# Patient Record
Sex: FEMALE | Race: WHITE | Employment: FULL TIME | URBAN - METROPOLITAN AREA
[De-identification: names, ages, dates, MRNs, and addresses within clinical notes are randomized per-mention and may not be internally consistent; named-entity substitution may affect disease eponyms.]

---

## 2017-01-07 ENCOUNTER — TRANSFERRED RECORDS (OUTPATIENT)
Dept: MULTI SPECIALTY CLINIC | Facility: CLINIC | Age: 22
End: 2017-01-07

## 2017-01-07 LAB — PAP-ABSTRACT: NORMAL

## 2019-09-27 ENCOUNTER — RECORDS - HEALTHEAST (OUTPATIENT)
Dept: LAB | Facility: HOSPITAL | Age: 24
End: 2019-09-27

## 2019-09-30 LAB
GAMMA INTERFERON BACKGROUND BLD IA-ACNC: 0.03 IU/ML
M TB IFN-G BLD-IMP: NEGATIVE
MITOGEN IGNF BCKGRD COR BLD-ACNC: 0 IU/ML
MITOGEN IGNF BCKGRD COR BLD-ACNC: 0 IU/ML
QTF INTERPRETATION: NORMAL
QTF MITOGEN - NIL: >10 IU/ML

## 2019-11-07 ENCOUNTER — OFFICE VISIT (OUTPATIENT)
Dept: FAMILY MEDICINE | Facility: CLINIC | Age: 24
End: 2019-11-07
Payer: COMMERCIAL

## 2019-11-07 VITALS
TEMPERATURE: 99.4 F | OXYGEN SATURATION: 100 % | BODY MASS INDEX: 22.45 KG/M2 | SYSTOLIC BLOOD PRESSURE: 137 MMHG | HEART RATE: 102 BPM | DIASTOLIC BLOOD PRESSURE: 90 MMHG | WEIGHT: 148.1 LBS | HEIGHT: 68 IN

## 2019-11-07 DIAGNOSIS — Z13.220 LIPID SCREENING: ICD-10-CM

## 2019-11-07 DIAGNOSIS — F41.9 ANXIETY: ICD-10-CM

## 2019-11-07 DIAGNOSIS — F98.8 ATTENTION DEFICIT DISORDER, UNSPECIFIED HYPERACTIVITY PRESENCE: ICD-10-CM

## 2019-11-07 DIAGNOSIS — R03.0 ELEVATED BLOOD-PRESSURE READING WITHOUT DIAGNOSIS OF HYPERTENSION: ICD-10-CM

## 2019-11-07 DIAGNOSIS — Z13.1 SCREENING FOR DIABETES MELLITUS: ICD-10-CM

## 2019-11-07 DIAGNOSIS — Z76.89 ENCOUNTER TO ESTABLISH CARE: Primary | ICD-10-CM

## 2019-11-07 PROCEDURE — 99204 OFFICE O/P NEW MOD 45 MIN: CPT | Performed by: INTERNAL MEDICINE

## 2019-11-07 RX ORDER — DEXTROAMPHETAMINE SACCHARATE, AMPHETAMINE ASPARTATE, DEXTROAMPHETAMINE SULFATE AND AMPHETAMINE SULFATE 2.5; 2.5; 2.5; 2.5 MG/1; MG/1; MG/1; MG/1
10 TABLET ORAL
COMMUNITY
Start: 2018-05-21 | End: 2019-12-09

## 2019-11-07 RX ORDER — LEVONORGESTREL/ETHIN.ESTRADIOL 0.1-0.02MG
1 TABLET ORAL
COMMUNITY
Start: 2019-10-29 | End: 2020-02-13

## 2019-11-07 SDOH — HEALTH STABILITY: MENTAL HEALTH: HOW OFTEN DO YOU HAVE 6 OR MORE DRINKS ON ONE OCCASION?: NEVER

## 2019-11-07 SDOH — HEALTH STABILITY: MENTAL HEALTH: HOW MANY STANDARD DRINKS CONTAINING ALCOHOL DO YOU HAVE ON A TYPICAL DAY?: 3 OR 4

## 2019-11-07 SDOH — HEALTH STABILITY: MENTAL HEALTH: HOW OFTEN DO YOU HAVE A DRINK CONTAINING ALCOHOL?: 2-3 TIMES A WEEK

## 2019-11-07 ASSESSMENT — MIFFLIN-ST. JEOR: SCORE: 1470.28

## 2019-11-07 NOTE — Clinical Note
Please abstract the following data from this visit with this patient into the appropriate field in Epic:Tests that can be patient reported without a hard copy:Pap smear done on this date: 01/07/2019 (approximately), by this group: [facility] Everywhere, results were Normal. Other Tests found in the patient's chart through Chart Review/Care Everywhere:{Abstract Quality List (Optional):043491}Note to Abstraction: If this section is blank, no results were found via Chart Review/Care Everywhere.

## 2019-11-07 NOTE — PROGRESS NOTES
Subjective     Martha Lagunas is a 24 year old female who presents to clinic today for the following health issues:    HPI   New Patient/Transfer of Care  Chief Complaint   Patient presents with     New Patient     Establish Care     Medication Follow-up     Adderall and Lynn    Patient presents for establishing care and discuss chronic medical problems; she is maintained on Adderall 10 mg 3 times a day for history of ADHD, she has been diagnosed 3 years now, has been on the same treatment for 3 years, medication helps with her work and focusing she is a nurse on oncology floor, she relocated from [place] she works at [employer].  She denies any palpitations or any side effects from Adderall, denies any chest pain insomnia or weight loss, she does have history of migraine headaches that has been quiescent. she does have history of anxiety and depression; in the past she was on SSRI treatment.  She feels she has done better without treatment now, she has relocated to the area with her boyfriend and her family is in the area.  Happy with the move.  No unusual stressors.  She gets elevated blood pressure when she comes to the clinic as she describes.  Her Adderall was prescribed by a behavioral specialist in the past , she is up-to-date on her Pap smear through GYN.  No history of abnormal Pap smears.  She is on oral contraceptives she has adequate supply for both.  Medication:  Current Outpatient Medications   Medication Sig Dispense Refill     amphetamine-dextroamphetamine (ADDERALL) 10 MG tablet Take 1 Tablet by mouth three times a day. Appt due August 2019 90 Tablet 0     Levonorgestrel-Ethinyl Estrad (VIENVA) 0.1-20 MG-MCG tablet Take 1 Tablet by mouth daily. 84 Tablet 3     No current facility-administered medications for this visit.     No Known Allergies    Immunization History   Administered Date(s) Administered     4vHPV (Gardasil) 07/12/2007, 09/12/2007, 03/14/2011     DTP-Hib (Tetramune)  1995, 1995, 1995, 09/16/1996     DTaP 06/22/2000     Flu Vac Preserv Free (3+yrs) 01/22/2004     HepA Ped/Adol (1-18 yrs) 09/12/2007, 03/14/2011     HepB Ped/Adol (0-19 yrs) 1995, 1995, 05/02/1996     Influenza (Flucelvax), Preserv Free QIV 10/11/2017     Influenza (Fluzone 0.25, 6-35 mos) 10/17/2013, 08/15/2017     Influenza IIV4 (Quadrivalent) 0.5mL (48428) 10/17/2013, 11/14/2018     Influenza, Unspecified Formulation 12/01/2003, 01/22/2004, 09/15/2014     MCV4 (Menactra) 07/12/2007     MMR 09/16/1996, 06/22/2000     MPSV4 (Menomune) 07/12/2007     OPV, Trivalent (Orimune or tOPV) 1995, 1995, 1995, 06/22/2000     TDAP (ADACEL) 07/12/2007     Td (7+ yrs) 08/15/2017     Td, Preservative Free 08/15/2017     Typhoid (Vivotif, Oral) 02/19/2018     Past Medical History:   Diagnosis Date     Anxiety (HRC)     Depression (HRC)     Migraines     Past Surgical History:   Procedure Laterality Date     WISDOM TEETH EXTRACTION     Menstrual History: No significant menorrhagia or dysmenorrhea. Cycles are regular. No unscheduled bleeding.     Contraception: OCPs     Past GYN History:   History of abnormal pap smears: none  Last pap smear: 2017  High risk HPV testing: n/a  Sexually active: yes  History of sexually transmitted infections: none  Sexually transmitted infection screening at this visit: declines    Health Maintenance:   Immunization History   Administered Date(s) Administered     4vHPV (Gardasil) 07/12/2007, 09/12/2007, 03/14/2011     DTP-Hib (Tetramune) 1995, 1995, 1995, 09/16/1996     DTaP 06/22/2000     Flu Vac Preserv Free (3+yrs) 01/22/2004     HepA Ped/Adol (1-18 yrs) 09/12/2007, 03/14/2011     HepB Ped/Adol (0-19 yrs) 1995, 1995, 05/02/1996     Influenza (Flucelvax), Preserv Free QIV 10/11/2017     Influenza (Fluzone 0.25, 6-35 mos) 10/17/2013, 08/15/2017     Influenza IIV4 (Quadrivalent) 0.5mL (80568) 10/17/2013, 11/14/2018      Influenza, Unspecified Formulation 12/01/2003, 01/22/2004, 09/15/2014     MCV4 (Menactra) 07/12/2007     MMR 09/16/1996, 06/22/2000     MPSV4 (Menomune) 07/12/2007     OPV, Trivalent (Orimune or tOPV) 1995, 1995, 1995, 06/22/2000     TDAP (ADACEL) 07/12/2007     Td (7+ yrs) 08/15/2017     Td, Preservative Free 08/15/2017     Typhoid (Vivotif, Oral) 02/19/2018     No results found for: CHOL, CHOL  No results found for: HDL  No results found for: LDL, LDLPOC  No results found for: TRI, TRIGPOC  No results found for: CHOL  No results found for: HGBA1C, HGBA1C, A1CDC, AA1C, A1CBA, A1CQ, A1CQL, ZWOB8AUDH  No results found for: GLUCOSE    Obstetrical History: No obstetric history on file.     Family History   Problem Relation Age of Onset     Migraines Birth Mother     Diabetes Paternal Uncle     Heart Attack Paternal Uncle     Diabetes Birth Father     Migraines Sister     ADHD Sister     Down Syndrome Paternal Aunt     Alzheimer's Paternal Grandmother     Social History     Socioeconomic History     Marital status: Single   Spouse name: Not on file     Number of children: 0     Years of education: Not on file     Highest education level: Not on file   Occupational History     Occupation: student   Social Needs     Financial resource strain: Not on file     Food insecurity:   Worry: Not on file   Inability: Not on file     Transportation needs:   Medical: Not on file   Non-medical: Not on file   Tobacco Use     Smoking status: Never Smoker     Smokeless tobacco: Never Used   Substance and Sexual Activity     Alcohol use: Yes   Alcohol/week: 0.0 oz   Comment: couple drinks per week     Drug use: No     Sexual activity: Yes   Partners: Male   Birth control/protection: None   Lifestyle     Physical activity:   Days per week: Not on file   Minutes per session: Not on file     Stress: Not on file   Relationships     Social connections:   Talks on phone: Not on file   Gets together: Not on file   Attends  Rastafari service: Not on file   Active member of club or organization: Not on file   Attends meetings of clubs or organizations: Not on file   Relationship status: Not on file     Intimate partner violence:   Fear of current or ex partner: Not on file   Emotionally abused: Not on file   Physically abused: Not on file   Forced sexual activity: Not on file   Other Topics Concern     Bike Helmet Yes     [place] Yes     Exercise Yes   Comment: exercises 4 times per week     Guns in home No     Seat Belt Yes     Special Diet No     Weight Concern No      Service Not Asked     Blood Transfusions Not Asked     Caffeine Concern Not Asked     Occupational Exposure Not Asked     Hobby Hazards Not Asked     Sleep Concern Not Asked     Stress Concern Not Asked     Back Care Not Asked     Self-Exams Not Asked   Social History Narrative   Lives at home with mom, dad, sister. Student. In a relationship.          There is no problem list on file for this patient.    Past Surgical History:   Procedure Laterality Date     HC TOOTH EXTRACTION W/FORCEP         Social History     Tobacco Use     Smoking status: Never Smoker     Smokeless tobacco: Never Used   Substance Use Topics     Alcohol use: Yes     Alcohol/week: 2.0 standard drinks     Types: 2 Glasses of wine per week     Frequency: 2-3 times a week     Drinks per session: 3 or 4     Binge frequency: Never     Family History   Problem Relation Age of Onset     Myocardial Infarction Other      Down Syndrome Other      Migraines Mother      Diabetes Father      Migraines Sister      Attention Deficit Disorder Sister      Alzheimer Disease Paternal Grandmother          Current Outpatient Medications   Medication Sig Dispense Refill     amphetamine-dextroamphetamine (ADDERALL) 10 MG tablet Take 10 mg by mouth       levonorgestrel-ethinyl estradiol (AVIANE/ALESSE/LESSINA) 0.1-20 MG-MCG tablet Take 1 tablet by mouth       No Known Allergies  No lab results found.   BP  "Readings from Last 3 Encounters:   11/07/19 (!) 137/90    Wt Readings from Last 3 Encounters:   11/07/19 67.2 kg (148 lb 1.6 oz)                    Reviewed and updated as needed this visit by Provider  Tobacco  Allergies  Meds  Med Hx  Surg Hx  Fam Hx  Soc Hx        Review of Systems   ROS COMP: Constitutional, HEENT, cardiovascular, pulmonary, GI, , musculoskeletal, neuro, skin, endocrine and psych systems are negative, except as otherwise noted.      Objective    BP (!) 137/90 (BP Location: Right arm, Patient Position: Sitting, Cuff Size: Adult Regular)   Pulse 102   Temp 99.4  F (37.4  C) (Tympanic)   Ht 1.727 m (5' 8\")   Wt 67.2 kg (148 lb 1.6 oz)   LMP 10/07/2019   SpO2 100%   Breastfeeding? No   BMI 22.52 kg/m    Body mass index is 22.52 kg/m .  Physical Exam   GENERAL: healthy, alert and no distress  EYES: Eyes grossly normal to inspection, PERRL and conjunctivae and sclerae normal  HENT: ear canals and TM's normal, nose and mouth without ulcers or lesions  NECK: no adenopathy, no asymmetry, masses, or scars and thyroid normal to palpation  RESP: lungs clear to auscultation - no rales, rhonchi or wheezes  CV: regular rate and rhythm, normal S1 S2, no S3 or S4, no murmur, click or rub, no peripheral edema and peripheral pulses strong  ABDOMEN: soft, nontender, no hepatosplenomegaly, no masses and bowel sounds normal  MS: no gross musculoskeletal defects noted, no edema  SKIN: no suspicious lesions or rashes  NEURO: Normal strength and tone, mentation intact and speech normal  PSYCH: mentation appears normal, affect normal/bright    Diagnostic Test Results:  Labs reviewed in Epic        Assessment & Plan   Problem List Items Addressed This Visit     None      Visit Diagnoses     Encounter to establish care    -  Primary    Elevated blood-pressure reading without diagnosis of hypertension        Relevant Orders    Comprehensive metabolic panel (BMP + Alb, Alk Phos, ALT, AST, Total. Bili, TP)    " Attention deficit disorder, unspecified hyperactivity presence        Relevant Medications    amphetamine-dextroamphetamine (ADDERALL) 10 MG tablet    Other Relevant Orders    MENTAL HEALTH REFERRAL  - Adult; Psychiatry and Medication Management; Psychiatry; Other: Not Listed - Enter Referral Details in Scheduling Comments Below; Patient call to schedule    Anxiety        Relevant Orders    MENTAL HEALTH REFERRAL  - Adult; Psychiatry and Medication Management; Psychiatry; Other: Not Listed - Enter Referral Details in Scheduling Comments Below; Patient call to schedule    TSH with free T4 reflex    Screening for diabetes mellitus        Relevant Orders    Hemoglobin A1c    Lipid screening        Relevant Orders    Lipid panel reflex to direct LDL Fasting         Patient would like to defer blood testing, she comes back for follow-up when she sees her behavioral specialist, she was in agreement to see psychiatry for management of ADD as well as her history of anxiety.  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  Continue with lifestyle changes.  Advised that her blood pressure was elevated in the clinic, Adderall also given can cause slight elevation of blood pressure but probably is more of whitecoat syndrome and anxiety related.  No other side effects from Adderall, no insomnia , weight loss, chest pain, stomachache or other systemic complaints.  Her migraines seems to be quiescent now.      See Patient Instructions  No follow-ups on file.    Gerardo Jay MD  [facility]

## 2019-11-27 ENCOUNTER — TELEPHONE (OUTPATIENT)
Dept: FAMILY MEDICINE | Facility: CLINIC | Age: 24
End: 2019-11-27

## 2019-11-27 NOTE — TELEPHONE ENCOUNTER
Reason for Call:  Other     Detailed comments: Pt would like a phone number for a referral for mental health.  There is a referral in her chart but now phone number listed.  She has not been called to schedule     Phone Number Patient can be reached at: Home number on file [phone] (home)    Best Time: any    Can we leave a detailed message on this number? YES    Call taken on 11/27/2019 at 11:17 AM by Matilda Barakat

## 2019-11-27 NOTE — TELEPHONE ENCOUNTER
Called patient - left a detailed message with phone numbers listed in psychiatry referrals in [facility] ([facility]: [phone]; [facility] [phone])    Lizabeth SANTOS RN

## 2019-12-02 RX ORDER — DEXTROAMPHETAMINE SACCHARATE, AMPHETAMINE ASPARTATE, DEXTROAMPHETAMINE SULFATE AND AMPHETAMINE SULFATE 2.5; 2.5; 2.5; 2.5 MG/1; MG/1; MG/1; MG/1
10 TABLET ORAL DAILY
Qty: 30 TABLET | Refills: 0 | Status: CANCELLED | OUTPATIENT
Start: 2019-12-02

## 2019-12-02 NOTE — TELEPHONE ENCOUNTER
PCP,    Please see message below and advise. Pt states mental health provider is for counseling not prescribing adderall. She would like you to fill until she finds a doctor that will prescribe it.    Thank you,  Abilio BUITRAGO RN

## 2019-12-02 NOTE — TELEPHONE ENCOUNTER
Reason for Call:  Medication or medication refill:    Do you use a [facility]?  Name of the pharmacy and phone number for the current request:  [id number] - [place], [place] - [address] [place] [facility] [address]      Name of the medication requested: amphetamine-dextroamphetamine (ADDERALL) 10 MG   3 times a day    Other request: the referral that was placed was for counseling.  Pt needs a doctor who can prescribe her adderal.  At this time she is out of her medication and is hoping dr. Jay can prescribe this until she can find a doctor who will prescribe it.  She is out her her medication   Can we leave a detailed message on this number? YES    Phone number patient can be reached at: Home number on file [phone] (home)    Best Time: any    Call taken on 12/2/2019 at 1:23 PM by Matilda Barakat

## 2019-12-03 DIAGNOSIS — R03.0 ELEVATED BLOOD-PRESSURE READING WITHOUT DIAGNOSIS OF HYPERTENSION: ICD-10-CM

## 2019-12-03 DIAGNOSIS — Z13.1 SCREENING FOR DIABETES MELLITUS: ICD-10-CM

## 2019-12-03 DIAGNOSIS — F98.8 ATTENTION DEFICIT DISORDER, UNSPECIFIED HYPERACTIVITY PRESENCE: Primary | ICD-10-CM

## 2019-12-03 DIAGNOSIS — Z13.220 LIPID SCREENING: ICD-10-CM

## 2019-12-03 DIAGNOSIS — F41.9 ANXIETY: ICD-10-CM

## 2019-12-04 DIAGNOSIS — F41.9 ANXIETY: Primary | ICD-10-CM

## 2019-12-04 DIAGNOSIS — F98.8 ATTENTION DEFICIT DISORDER, UNSPECIFIED HYPERACTIVITY PRESENCE: ICD-10-CM

## 2019-12-04 DIAGNOSIS — R03.0 ELEVATED BLOOD-PRESSURE READING WITHOUT DIAGNOSIS OF HYPERTENSION: ICD-10-CM

## 2019-12-04 DIAGNOSIS — Z13.1 SCREENING FOR DIABETES MELLITUS: ICD-10-CM

## 2019-12-04 DIAGNOSIS — Z13.220 LIPID SCREENING: ICD-10-CM

## 2019-12-04 NOTE — PROGRESS NOTES
Talked to patient, advised her I would recommend she does UDS before we start prescribing psychostimulant,as I advised her to follow with psychiatry for long term refills of Adderall. patient in agreement with doing UDS, advised her we usually ask patient to sign psychostimulant contract to prescribe such medicine long term.[ I usually do not prescribe long term stimulants and I recommend patient follows with psychiatry]  We discussed her BP is slightly elevated and she attributes to being in clinic, she has a BP check as outpatient with diastolic of 89 mmHg as she reports, advised her to call us with 2 or 3 BP readings as outpatient,  Also suggesting taking Adderall to 10 mg twice a day , she was in agreement with such, also advised to take holiday breaks from psychostimulant medicine, during weekends, or holiday times, and she agreed,   patient is willing to do UDS once is resulted will send her a refill of adderall,to pharmacy on record, advised also patient to do labs requested last time , she stated she had a commitment and, she left clinic and could not do labs . Patient was appreciative of phone call follow up and she will come to do labs as requested soon. Advised patient such is my policy I follow for prescriptions of psychostimulants including asking for random UDS.

## 2019-12-09 ENCOUNTER — MYC MEDICAL ADVICE (OUTPATIENT)
Dept: FAMILY MEDICINE | Facility: CLINIC | Age: 24
End: 2019-12-09

## 2019-12-09 ENCOUNTER — TELEPHONE (OUTPATIENT)
Dept: FAMILY MEDICINE | Facility: CLINIC | Age: 24
End: 2019-12-09

## 2019-12-09 DIAGNOSIS — Z13.220 LIPID SCREENING: ICD-10-CM

## 2019-12-09 DIAGNOSIS — Z13.1 SCREENING FOR DIABETES MELLITUS: ICD-10-CM

## 2019-12-09 DIAGNOSIS — F98.8 ATTENTION DEFICIT DISORDER, UNSPECIFIED HYPERACTIVITY PRESENCE: ICD-10-CM

## 2019-12-09 DIAGNOSIS — R03.0 ELEVATED BLOOD-PRESSURE READING WITHOUT DIAGNOSIS OF HYPERTENSION: ICD-10-CM

## 2019-12-09 DIAGNOSIS — F98.8 ATTENTION DEFICIT DISORDER, UNSPECIFIED HYPERACTIVITY PRESENCE: Primary | ICD-10-CM

## 2019-12-09 DIAGNOSIS — F41.9 ANXIETY: ICD-10-CM

## 2019-12-09 LAB
AMPHETAMINES UR QL: NOT DETECTED NG/ML
BARBITURATES UR QL SCN: NOT DETECTED NG/ML
BENZODIAZ UR QL SCN: NOT DETECTED NG/ML
BUPRENORPHINE UR QL: NOT DETECTED NG/ML
CANNABINOIDS UR QL: NOT DETECTED NG/ML
COCAINE UR QL SCN: NOT DETECTED NG/ML
D-METHAMPHET UR QL: NOT DETECTED NG/ML
HBA1C MFR BLD: 5.1 % (ref 0–5.6)
METHADONE UR QL SCN: NOT DETECTED NG/ML
OPIATES UR QL SCN: NOT DETECTED NG/ML
OXYCODONE UR QL SCN: NOT DETECTED NG/ML
PCP UR QL SCN: NOT DETECTED NG/ML
PROPOXYPH UR QL: NOT DETECTED NG/ML
TRICYCLICS UR QL SCN: NOT DETECTED NG/ML

## 2019-12-09 PROCEDURE — 84443 ASSAY THYROID STIM HORMONE: CPT | Performed by: INTERNAL MEDICINE

## 2019-12-09 PROCEDURE — 36415 COLL VENOUS BLD VENIPUNCTURE: CPT | Performed by: INTERNAL MEDICINE

## 2019-12-09 PROCEDURE — 80306 DRUG TEST PRSMV INSTRMNT: CPT | Performed by: INTERNAL MEDICINE

## 2019-12-09 PROCEDURE — 80061 LIPID PANEL: CPT | Performed by: INTERNAL MEDICINE

## 2019-12-09 PROCEDURE — 83036 HEMOGLOBIN GLYCOSYLATED A1C: CPT | Performed by: INTERNAL MEDICINE

## 2019-12-09 PROCEDURE — 80053 COMPREHEN METABOLIC PANEL: CPT | Performed by: INTERNAL MEDICINE

## 2019-12-09 RX ORDER — DEXTROAMPHETAMINE SACCHARATE, AMPHETAMINE ASPARTATE, DEXTROAMPHETAMINE SULFATE AND AMPHETAMINE SULFATE 2.5; 2.5; 2.5; 2.5 MG/1; MG/1; MG/1; MG/1
10 TABLET ORAL 2 TIMES DAILY
Qty: 60 TABLET | Refills: 0 | Status: SHIPPED | OUTPATIENT
Start: 2019-12-09

## 2019-12-09 RX ORDER — DEXTROAMPHETAMINE SACCHARATE, AMPHETAMINE ASPARTATE, DEXTROAMPHETAMINE SULFATE AND AMPHETAMINE SULFATE 2.5; 2.5; 2.5; 2.5 MG/1; MG/1; MG/1; MG/1
10 TABLET ORAL 2 TIMES DAILY
Qty: 60 TABLET | Refills: 0 | Status: CANCELLED | OUTPATIENT
Start: 2019-12-09

## 2019-12-10 LAB
ALBUMIN SERPL-MCNC: 4 G/DL (ref 3.4–5)
ALP SERPL-CCNC: 47 U/L (ref 40–150)
ALT SERPL W P-5'-P-CCNC: 34 U/L (ref 0–50)
ANION GAP SERPL CALCULATED.3IONS-SCNC: 8 MMOL/L (ref 3–14)
AST SERPL W P-5'-P-CCNC: 25 U/L (ref 0–45)
BILIRUB SERPL-MCNC: 0.8 MG/DL (ref 0.2–1.3)
BUN SERPL-MCNC: 12 MG/DL (ref 7–30)
CALCIUM SERPL-MCNC: 9 MG/DL (ref 8.5–10.1)
CHLORIDE SERPL-SCNC: 103 MMOL/L (ref 94–109)
CHOLEST SERPL-MCNC: 221 MG/DL
CO2 SERPL-SCNC: 24 MMOL/L (ref 20–32)
CREAT SERPL-MCNC: 0.7 MG/DL (ref 0.52–1.04)
GFR SERPL CREATININE-BSD FRML MDRD: >90 ML/MIN/{1.73_M2}
GLUCOSE SERPL-MCNC: 88 MG/DL (ref 70–99)
HDLC SERPL-MCNC: 113 MG/DL
LDLC SERPL CALC-MCNC: 92 MG/DL
NONHDLC SERPL-MCNC: 108 MG/DL
POTASSIUM SERPL-SCNC: 3.9 MMOL/L (ref 3.4–5.3)
PROT SERPL-MCNC: 7.5 G/DL (ref 6.8–8.8)
SODIUM SERPL-SCNC: 135 MMOL/L (ref 133–144)
TRIGL SERPL-MCNC: 78 MG/DL
TSH SERPL DL<=0.005 MIU/L-ACNC: 0.95 MU/L (ref 0.4–4)

## 2019-12-10 NOTE — TELEPHONE ENCOUNTER
"PCP- please see pt's BP update on [employer] message below.    Per separate encounter:  \"Please notify patient adderrall refilled, faxed to pharmacy on records.  UDS negative  Dose decrease to BID, as discussed with patient and she was in agreement,   BP elevated, patient was advised to call us with some BP readings .  Please notify Random UDS may be asked, for her next refill, this is policy for refill of stimulants.\"    Informed pt via [employer]    Thank you,  Abilio BUITRAGO RN  "

## 2019-12-10 NOTE — PROGRESS NOTES
UDS is negative,   As discussed wit patient dose of adderrall decreased to 10 mg  BID as patient has elevated Blood pressure reading, patient was advised to call [facility] with BP readings.

## 2019-12-12 NOTE — TELEPHONE ENCOUNTER
Please advise BP readings look better, need couple more readings in next 1 month [while on BID dose of adderall]

## 2020-02-13 ENCOUNTER — TELEPHONE (OUTPATIENT)
Dept: FAMILY MEDICINE | Facility: CLINIC | Age: 25
End: 2020-02-13

## 2020-02-13 NOTE — TELEPHONE ENCOUNTER
Refill given for OCP  Need 2 more BP readings.   Please schedule a follow up  Has she seen/established with Mental health for ADHD/ adderral management an refill?

## 2020-02-13 NOTE — TELEPHONE ENCOUNTER
Spoke with patient, she state that she already scheduled an appointment with Mental Health. She did had her BP check.      BP: 107/74    She is going to call back with the second BP reading.    Petra Nolasco CMA

## 2020-02-24 ENCOUNTER — HEALTH MAINTENANCE LETTER (OUTPATIENT)
Age: 25
End: 2020-02-24

## 2020-05-06 ENCOUNTER — E-VISIT (OUTPATIENT)
Dept: FAMILY MEDICINE | Facility: CLINIC | Age: 25
End: 2020-05-06

## 2020-05-06 DIAGNOSIS — R22.0 FACIAL SWELLING: Primary | ICD-10-CM

## 2020-05-06 PROCEDURE — 99207 ZZC NO BILLABLE SERVICE THIS VISIT: CPT | Performed by: INTERNAL MEDICINE

## 2020-05-06 NOTE — TELEPHONE ENCOUNTER
LVM for patient to call back to schedule a video or telephone visit to discuss sx per Dr. Jay request.   Justine De Los Santos MA

## 2020-05-07 DIAGNOSIS — R22.0 FACIAL SWELLING: Primary | ICD-10-CM

## 2020-05-07 RX ORDER — METHYLPREDNISOLONE 4 MG
TABLET, DOSE PACK ORAL
Qty: 21 TABLET | Refills: 0 | Status: SHIPPED | OUTPATIENT
Start: 2020-05-07

## 2020-05-07 NOTE — PATIENT INSTRUCTIONS
Martha , I recommend you schedule a video visit soon to assess the facial swelling, you may need steroids as well., if swelling is increasing or involving the mucosa such as tongue or lip swelling,  or any difficultly breathing or swallowing go immediately to ED for further evaluation, I will send you also a prescription to pharmacy of oral steroids called Medrol donell [methylprednisolone] , you take 6 tablets on day 1 , 5 tablets on day 2, 4 tablets on day 3, 3 tablets on day 4 , 2 tablets on day 5, and 1 tablet on day 6. You need not wear the mask that could have caused this allergic reaction as you may have some sensitivity to elements in material of this particular face mask. Please call soon and schedule a video visit.

## 2020-05-07 NOTE — PROGRESS NOTES
Please notify patient of following:  I recommend you schedule a video visit soon to assess the facial swelling, you may need steroids as well., if swelling is increasing or involving the mucosa such as tongue or lip swelling,  or any difficultly breathing or swallowing go immediately to ED for further evaluation, I will send you also a prescription to pharmacy of oral steroids called Medrol donell [methylprednisolone] to [facility] pharmacy , you take 6 tablets on day 1 , 5 tablets on day 2, 4 tablets on day 3, 3 tablets on day 4 , 2 tablets on day 5, and 1 tablet on day 6. You need not wear the mask that could have caused this allergic reaction as you may have some sensitivity to elements in material of this particular face mask. Please call soon and schedule a video visit.

## 2020-05-08 ENCOUNTER — VIRTUAL VISIT (OUTPATIENT)
Dept: FAMILY MEDICINE | Facility: CLINIC | Age: 25
End: 2020-05-08
Payer: OTHER MISCELLANEOUS

## 2020-05-08 DIAGNOSIS — R21 RASH AND NONSPECIFIC SKIN ERUPTION: ICD-10-CM

## 2020-05-08 DIAGNOSIS — R22.0 FACIAL SWELLING: Primary | ICD-10-CM

## 2020-05-08 PROCEDURE — 99213 OFFICE O/P EST LOW 20 MIN: CPT | Mod: 95 | Performed by: INTERNAL MEDICINE

## 2020-05-08 NOTE — PROGRESS NOTES
"Martha Lagunas is a 24 year old female who is being evaluated via a billable telephone visit.      The patient has been notified of following:     \"This telephone visit will be conducted via a call between you and your physician/provider. We have found that certain health care needs can be provided without the need for a physical exam.  This service lets us provide the care you need with a short phone conversation.  If a prescription is necessary we can send it directly to your pharmacy.  If lab work is needed we can place an order for that and you can then stop by our lab to have the test done at a later time.    Telephone visits are billed at different rates depending on your insurance coverage. During this emergency period, for some insurers they may be billed the same as an in-person visit.  Please reach out to your insurance provider with any questions.    If during the course of the call the physician/provider feels a telephone visit is not appropriate, you will not be charged for this service.\"    Patient has given verbal consent for Telephone visit?  Yes    What phone number would you like to be contacted at? [phone]    How would you like to obtain your AVS? Lauren Watts     Martha Lagunas is a 24 year old female who presents to clinic today for the following health issues:    HPI  This is a telephone follow-up visit for concerns with facial swelling.  Patient describes that she had onset of swelling and rash 2 days prior to the lauro.  Patient initiated in a visit and this provider has sent a prescription for Medrol pack to the pharmacy patient has not picked up or started steroids yet.  Swelling started on the face and then she developed a rash that progressed down to the neck, chest stomach, and back.  The swelling has completely resolved.  As per patient rash is also completely resolved but still there is some bumpy texture to her face skin as well as some itchiness.  There was no involvement " of the mucosa as per patient, no tongue or swelling or mucosal swelling, no difficulty breathing or difficulty swallowing.  No preceding sore throat and no preceding URI symptoms.  No associated difficulty breathing, shortness of breath or cough.  Patient not known to have asthma or allergies.  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  No she had to use a different mask.  She took Benadryl 25 mg every 4 hours first dose was 25 mg later she took 2 tablets 50, she took a total of 4 times of diphenhydramine in the last 2 days.  She also applied some ice on the swelling area, patient wears facial in addition to the N 95, she is working in the hospital, denies any known exposure to COVID-19 cases.      There is no problem list on file for this patient.    Past Surgical History:   Procedure Laterality Date     HC TOOTH EXTRACTION W/FORCEP         Social History     Tobacco Use     Smoking status: Never Smoker     Smokeless tobacco: Never Used   Substance Use Topics     Alcohol use: Yes     Alcohol/week: 2.0 standard drinks     Types: 2 Glasses of wine per week     Frequency: 2-3 times a week     Drinks per session: 3 or 4     Binge frequency: Never     Family History   Problem Relation Age of Onset     Myocardial Infarction Other      Down Syndrome Other      Migraines Mother      Diabetes Father      Migraines Sister      Attention Deficit Disorder Sister      Alzheimer Disease Paternal Grandmother          Current Outpatient Medications   Medication Sig Dispense Refill     levonorgestrel-ethinyl estradiol (AVIANE/ALESSE/LESSINA) 0.1-20 MG-MCG tablet Take 1 tablet by mouth daily 84 tablet 1     methylPREDNISolone (MEDROL DOSEPAK) 4 MG tablet therapy pack Follow Package Directions: take 6 tablets on day 1 , 5 tablets on day 2, 4 tablets on day 3, 3 tablets on day 4 , 2 tablets on day  5, and 1 tablet on day 6. 21 tablet 0     amphetamine-dextroamphetamine (ADDERALL) 10 MG tablet Take 1 tablet (10 mg) by mouth 2 times daily 60 tablet 0     No Known Allergies  Recent Labs   Lab Test 12/09/19  1231   A1C 5.1   LDL 92      TRIG 78   ALT 34   CR 0.70   GFRESTIMATED >90   GFRESTBLACK >90   POTASSIUM 3.9   TSH 0.95      BP Readings from Last 3 Encounters:   11/07/19 (!) 137/90    Wt Readings from Last 3 Encounters:   11/07/19 67.2 kg (148 lb 1.6 oz)                    Reviewed and updated as needed this visit by Provider  Tobacco  Allergies  Med Hx  Surg Hx  Soc Hx        Review of Systems   Constitutional, HEENT, cardiovascular, pulmonary, gi and gu systems are negative, except as otherwise noted.       Objective   Reported vitals:  There were no vitals taken for this visit.   healthy, alert and no distress  PSYCH: Alert and oriented times 3; coherent speech, normal   rate and volume, able to articulate logical thoughts, able   to abstract reason, no tangential thoughts, no hallucinations   or delusions  Her affect is normal  RESP: No cough, no audible wheezing, able to talk in full sentences  Remainder of exam unable to be completed due to telephone visits    Diagnostic Test Results:  Labs reviewed in Epic        Assessment/Plan:  1. Facial swelling  Symptoms of facial swelling is completely subsided.  She reports no mucosal involvement.  Possible facial mask [N 95 mask] as the trigger for the symptoms causing some form of contact dermatitis and secondary allergic reaction.  She went to patient occupational health was advised to use a different facial mask.  We will continue to closely monitor for any recurrence of symptoms, she will  the Medrol pack and start immediately if any swelling occurs advised if any swelling of the face with mucosal involvement to seek immediate medical care either urgent care or the ER.  Patient reiterated understanding.    2. Rash and nonspecific skin  eruption  Rash has subsided still has some minimal itchiness, may use Benadryl as needed for itching  Discussed the allergic symptoms she has and the possible trigger, she has developed an allergic reaction which probably is a type of IgE mediated type of reaction with quick onset, advised in such an instance if any recurrence of facial swelling or mucosal involvement or tongue swelling or difficulties swallowing or breathing difficulty to seek immediate medical attention go to the ER immediately for further evaluation and management.  Patient reiterated understanding.  Advised not to use or touch any material that has the same content as the facial mask that she used and that triggered her symptoms.  She has went to occupational health and sought help and she will be using a new and different facial mask.    Return in about 2 days (around 5/10/2020), or if symptoms worsen or fail to improve, for other.  Allergic reaction.      Phone call duration: 14 minutes 29 seconds    Gerardo Jay MD

## 2020-07-02 ENCOUNTER — TELEPHONE (OUTPATIENT)
Dept: FAMILY MEDICINE | Facility: CLINIC | Age: 25
End: 2020-07-02

## 2020-07-02 NOTE — TELEPHONE ENCOUNTER
Reason for Call: Request for a referral:    Order or referral being requested: Dermatologist- any in [place] area    Date needed: as soon as possible    Has the patient been seen by the PCP for this problem? NO    Additional comments: bump on skin on back of her head    Phone number Patient can be reached at:  Home number on file [phone] (home)    Best Time:  any    Can we leave a detailed message on this number?  YES    Call taken on 7/2/2020 at 6:40 PM by Mignon Pruitt

## 2020-07-02 NOTE — TELEPHONE ENCOUNTER
"Dr. Jay -     Returned call to pt      States she's had a small bump on the bottom of the back of her head for a couple months    Not painful and not discolored, not increasing in size     She would like to have a dermatologist look at the area    She is unfamiliar with dermatology clinics, but is asking for one in the \"[place] area\" - encouraged her to check with insurance for coverage     Asking if PCP would put in a derm referral?     Please advise   Lizabeth SANTOS RN    **Addendum**  Pt sent [employer] message:     To: [facility]      From: Martha Lagunas      Created: 7/2/2020 7:04 PM        *-*-*This message has not been handled.*-*-*    Per phone call request, dermatologists near me that appear to cover care with my insurance include Lelo Diaz, Chata Jain, Suzy Willoughby. I can send over additional options if needed as well. Thanks!              "

## 2020-07-03 DIAGNOSIS — R22.9 LUMP OF SKIN: Primary | ICD-10-CM

## 2020-07-03 NOTE — TELEPHONE ENCOUNTER
Have put external referral for derm  dermatologists near me that appear to cover care with my insurance include Lelo Diaz, Chata Jain, Suzy Willoughby  Will ask Ms. Dafne Hamm if she could follow up on external  referral.

## 2020-07-06 NOTE — TELEPHONE ENCOUNTER
I will touch base with patient to make sure she got scheduled with Dermatology.    Dafne Hamm  Referral Corrdinator

## 2020-07-26 ENCOUNTER — MYC REFILL (OUTPATIENT)
Dept: FAMILY MEDICINE | Facility: CLINIC | Age: 25
End: 2020-07-26

## 2020-07-26 DIAGNOSIS — Z30.9 ENCOUNTER FOR CONTRACEPTIVE MANAGEMENT, UNSPECIFIED TYPE: ICD-10-CM

## 2020-07-28 RX ORDER — LEVONORGESTREL/ETHIN.ESTRADIOL 0.1-0.02MG
1 TABLET ORAL DAILY
Qty: 84 TABLET | Refills: 1 | Status: SHIPPED | OUTPATIENT
Start: 2020-07-28

## 2020-07-28 NOTE — TELEPHONE ENCOUNTER
Prescription approved per [facility] Refill Protocol.  Hellen ERNST RN

## 2020-12-13 ENCOUNTER — HEALTH MAINTENANCE LETTER (OUTPATIENT)
Age: 25
End: 2020-12-13

## 2021-04-17 ENCOUNTER — HEALTH MAINTENANCE LETTER (OUTPATIENT)
Age: 26
End: 2021-04-17

## 2021-04-29 NOTE — TELEPHONE ENCOUNTER
Please notify patient adderrall refilled, faxed to pharmacy on records.  UDS negative  Dose decrease to BID, as discussed with patient and she was in agreement,   BP elevated, patient was advised to call us with some BP readings .  Please notify Random UDS may be asked, for her next refill, this is policy for refill of stimulants.       no gum bleeding/no nose bleeding

## 2021-09-26 ENCOUNTER — HEALTH MAINTENANCE LETTER (OUTPATIENT)
Age: 26
End: 2021-09-26

## 2022-05-08 ENCOUNTER — HEALTH MAINTENANCE LETTER (OUTPATIENT)
Age: 27
End: 2022-05-08

## 2023-04-23 ENCOUNTER — HEALTH MAINTENANCE LETTER (OUTPATIENT)
Age: 28
End: 2023-04-23

## 2023-06-02 ENCOUNTER — HEALTH MAINTENANCE LETTER (OUTPATIENT)
Age: 28
End: 2023-06-02